# Patient Record
Sex: MALE | NOT HISPANIC OR LATINO | Employment: FULL TIME | URBAN - NONMETROPOLITAN AREA
[De-identification: names, ages, dates, MRNs, and addresses within clinical notes are randomized per-mention and may not be internally consistent; named-entity substitution may affect disease eponyms.]

---

## 2023-06-28 ENCOUNTER — OFFICE VISIT (OUTPATIENT)
Dept: PRIMARY CARE | Facility: CLINIC | Age: 33
End: 2023-06-28
Payer: COMMERCIAL

## 2023-06-28 VITALS
DIASTOLIC BLOOD PRESSURE: 90 MMHG | HEART RATE: 82 BPM | SYSTOLIC BLOOD PRESSURE: 128 MMHG | WEIGHT: 247.2 LBS | TEMPERATURE: 97.7 F | OXYGEN SATURATION: 97 % | BODY MASS INDEX: 31.74 KG/M2

## 2023-06-28 DIAGNOSIS — Z23 IMMUNIZATION DUE: ICD-10-CM

## 2023-06-28 DIAGNOSIS — F41.0 PANIC ATTACKS: ICD-10-CM

## 2023-06-28 DIAGNOSIS — Z00.00 ANNUAL PHYSICAL EXAM: Primary | ICD-10-CM

## 2023-06-28 DIAGNOSIS — I47.10 SUPRAVENTRICULAR TACHYCARDIA (CMS-HCC): ICD-10-CM

## 2023-06-28 PROBLEM — I10 BENIGN ESSENTIAL HTN: Status: ACTIVE | Noted: 2023-06-28

## 2023-06-28 PROBLEM — G47.33 OSA (OBSTRUCTIVE SLEEP APNEA): Status: ACTIVE | Noted: 2023-06-28

## 2023-06-28 PROBLEM — E78.00 HYPERCHOLESTEREMIA: Status: ACTIVE | Noted: 2023-06-28

## 2023-06-28 PROCEDURE — 3080F DIAST BP >= 90 MM HG: CPT | Performed by: INTERNAL MEDICINE

## 2023-06-28 PROCEDURE — 99214 OFFICE O/P EST MOD 30 MIN: CPT | Performed by: INTERNAL MEDICINE

## 2023-06-28 PROCEDURE — 90715 TDAP VACCINE 7 YRS/> IM: CPT | Performed by: INTERNAL MEDICINE

## 2023-06-28 PROCEDURE — 90471 IMMUNIZATION ADMIN: CPT | Performed by: INTERNAL MEDICINE

## 2023-06-28 PROCEDURE — 99395 PREV VISIT EST AGE 18-39: CPT | Performed by: INTERNAL MEDICINE

## 2023-06-28 PROCEDURE — 3074F SYST BP LT 130 MM HG: CPT | Performed by: INTERNAL MEDICINE

## 2023-06-28 RX ORDER — SERTRALINE HYDROCHLORIDE 50 MG/1
1 TABLET, FILM COATED ORAL DAILY
COMMUNITY
Start: 2021-12-23 | End: 2023-06-28 | Stop reason: ALTCHOICE

## 2023-06-28 RX ORDER — METOPROLOL SUCCINATE 50 MG/1
1 TABLET, EXTENDED RELEASE ORAL 2 TIMES DAILY
COMMUNITY
Start: 2021-12-23

## 2023-06-28 RX ORDER — TIZANIDINE 2 MG/1
TABLET ORAL
COMMUNITY
Start: 2022-11-28 | End: 2023-06-28 | Stop reason: ALTCHOICE

## 2023-06-28 ASSESSMENT — ENCOUNTER SYMPTOMS
OCCASIONAL FEELINGS OF UNSTEADINESS: 0
UNEXPECTED WEIGHT CHANGE: 0
DIARRHEA: 0
LOSS OF SENSATION IN FEET: 0
DIFFICULTY URINATING: 0
FATIGUE: 0
HYPERTENSION: 1
SORE THROAT: 0
ARTHRALGIAS: 0
DIZZINESS: 0
HEADACHES: 0
WHEEZING: 0
FEVER: 0
ABDOMINAL PAIN: 0
BRUISES/BLEEDS EASILY: 0
PALPITATIONS: 0
BLOOD IN STOOL: 0
SINUS PAIN: 0
DEPRESSION: 0
COUGH: 0

## 2023-06-28 ASSESSMENT — ANXIETY QUESTIONNAIRES
7. FEELING AFRAID AS IF SOMETHING AWFUL MIGHT HAPPEN: NOT AT ALL
5. BEING SO RESTLESS THAT IT IS HARD TO SIT STILL: NOT AT ALL
3. WORRYING TOO MUCH ABOUT DIFFERENT THINGS: NOT AT ALL
1. FEELING NERVOUS, ANXIOUS, OR ON EDGE: NOT AT ALL
2. NOT BEING ABLE TO STOP OR CONTROL WORRYING: NOT AT ALL
GAD7 TOTAL SCORE: 0
4. TROUBLE RELAXING: NOT AT ALL
6. BECOMING EASILY ANNOYED OR IRRITABLE: NOT AT ALL

## 2023-06-28 ASSESSMENT — PATIENT HEALTH QUESTIONNAIRE - PHQ9
2. FEELING DOWN, DEPRESSED OR HOPELESS: NOT AT ALL
1. LITTLE INTEREST OR PLEASURE IN DOING THINGS: NOT AT ALL
SUM OF ALL RESPONSES TO PHQ9 QUESTIONS 1 AND 2: 0

## 2023-06-28 NOTE — PROGRESS NOTES
Subjective   Patient ID: Gabo Guzman is a 32 y.o. male who presents for Anxiety (Discuss stopping medication) and Hypertension.    Annual preventive visit  - Vaccinations reviewed  Needs a booster for tetanus vaccine  - Screening for depression and anxiety reviewed no significant findings  Needs complete blood work    Follow-up  - Anxiety history of panic attacks resolved patient off sertraline continue monitoring closely as needed because about exercise multivitamin healthy diet  - Paroxysmal supraventricular tachycardia continue with metoprolol controlled and no recurrence  Proceed with blood work  -Lumbosacral strain no complaints continue with conservative measures  - Suspect  obstructive sleep apnea patient doing well continue with healthy diet weight loss  Follow-up in 1 year  Follow-up blood work results closely    Anxiety  Patient reports no chest pain, dizziness or palpitations.       Hypertension  Associated symptoms include anxiety. Pertinent negatives include no chest pain, headaches or palpitations.          Review of Systems   Constitutional:  Negative for fatigue, fever and unexpected weight change.   HENT:  Negative for congestion, ear discharge, ear pain, mouth sores, sinus pain and sore throat.    Eyes:  Negative for visual disturbance.   Respiratory:  Negative for cough and wheezing.    Cardiovascular:  Negative for chest pain, palpitations and leg swelling.   Gastrointestinal:  Negative for abdominal pain, blood in stool and diarrhea.   Genitourinary:  Negative for difficulty urinating.   Musculoskeletal:  Negative for arthralgias.   Skin:  Negative for rash.   Neurological:  Negative for dizziness and headaches.   Hematological:  Does not bruise/bleed easily.   Psychiatric/Behavioral:  Negative for behavioral problems.    All other systems reviewed and are negative.      Objective   Lab Results   Component Value Date    HGBA1C 5.2 12/23/2021      /90   Pulse 82   Temp 36.5 °C (97.7 °F)    Wt 112 kg (247 lb 3.2 oz)   SpO2 97%   BMI 31.74 kg/m²     Physical Exam  Vitals and nursing note reviewed.   Constitutional:       Appearance: Normal appearance.   HENT:      Head: Normocephalic.      Nose: Nose normal.   Eyes:      Conjunctiva/sclera: Conjunctivae normal.      Pupils: Pupils are equal, round, and reactive to light.   Cardiovascular:      Rate and Rhythm: Regular rhythm.   Pulmonary:      Effort: Pulmonary effort is normal.      Breath sounds: Normal breath sounds.   Abdominal:      General: Abdomen is flat.      Palpations: Abdomen is soft.   Musculoskeletal:      Cervical back: Neck supple.   Skin:     General: Skin is warm.   Neurological:      General: No focal deficit present.      Mental Status: He is oriented to person, place, and time.   Psychiatric:         Mood and Affect: Mood normal.         Assessment/Plan   Gabo was seen today for anxiety and hypertension.  Diagnoses and all orders for this visit:  Annual physical exam (Primary)  -     CBC and Auto Differential; Future  -     Comprehensive Metabolic Panel; Future  -     Lipid Panel; Future  -     TSH with reflex to Free T4 if abnormal; Future  Panic attacks  Supraventricular tachycardia (CMS/HCC)  Other orders  -     Follow Up In Primary Care - Health Maintenance; Future   Annual preventive visit  - Vaccinations reviewed  Needs a booster for tetanus vaccine  - Screening for depression and anxiety reviewed no significant findings  Needs complete blood work    Follow-up  - Anxiety history of panic attacks resolved patient off sertraline continue monitoring closely as needed because about exercise multivitamin healthy diet  - Paroxysmal supraventricular tachycardia continue with metoprolol controlled and no recurrence  Proceed with blood work  -Lumbosacral strain no complaints continue with conservative measures  - Suspect  obstructive sleep apnea patient doing well continue with healthy diet weight loss  Follow-up in 1 year  Follow-up  blood work results closely

## 2023-09-06 ENCOUNTER — OFFICE VISIT (OUTPATIENT)
Dept: PRIMARY CARE | Facility: CLINIC | Age: 33
End: 2023-09-06
Payer: COMMERCIAL

## 2023-09-06 ENCOUNTER — LAB (OUTPATIENT)
Dept: LAB | Facility: LAB | Age: 33
End: 2023-09-06
Payer: COMMERCIAL

## 2023-09-06 VITALS
OXYGEN SATURATION: 98 % | WEIGHT: 255 LBS | HEART RATE: 103 BPM | BODY MASS INDEX: 32.74 KG/M2 | DIASTOLIC BLOOD PRESSURE: 84 MMHG | SYSTOLIC BLOOD PRESSURE: 128 MMHG | TEMPERATURE: 98.1 F

## 2023-09-06 DIAGNOSIS — Z11.3 SCREENING FOR STDS (SEXUALLY TRANSMITTED DISEASES): ICD-10-CM

## 2023-09-06 DIAGNOSIS — N48.9 PENILE LESION: Primary | ICD-10-CM

## 2023-09-06 PROCEDURE — 3074F SYST BP LT 130 MM HG: CPT | Performed by: INTERNAL MEDICINE

## 2023-09-06 PROCEDURE — 86696 HERPES SIMPLEX TYPE 2 TEST: CPT

## 2023-09-06 PROCEDURE — 86803 HEPATITIS C AB TEST: CPT

## 2023-09-06 PROCEDURE — 86695 HERPES SIMPLEX TYPE 1 TEST: CPT

## 2023-09-06 PROCEDURE — 3079F DIAST BP 80-89 MM HG: CPT | Performed by: INTERNAL MEDICINE

## 2023-09-06 PROCEDURE — 99214 OFFICE O/P EST MOD 30 MIN: CPT | Performed by: INTERNAL MEDICINE

## 2023-09-06 PROCEDURE — 36415 COLL VENOUS BLD VENIPUNCTURE: CPT

## 2023-09-06 PROCEDURE — 86780 TREPONEMA PALLIDUM: CPT

## 2023-09-06 PROCEDURE — 87389 HIV-1 AG W/HIV-1&-2 AB AG IA: CPT

## 2023-09-06 PROCEDURE — 86694 HERPES SIMPLEX NES ANTBDY: CPT

## 2023-09-06 RX ORDER — DOXYCYCLINE 100 MG/1
100 CAPSULE ORAL 2 TIMES DAILY
Qty: 14 CAPSULE | Refills: 0 | Status: SHIPPED | OUTPATIENT
Start: 2023-09-06 | End: 2023-09-13

## 2023-09-06 RX ORDER — CLOTRIMAZOLE AND BETAMETHASONE DIPROPIONATE 10; .64 MG/G; MG/G
1 CREAM TOPICAL 2 TIMES DAILY
Qty: 15 G | Refills: 0 | Status: SHIPPED | OUTPATIENT
Start: 2023-09-06 | End: 2023-10-06

## 2023-09-06 ASSESSMENT — ENCOUNTER SYMPTOMS
DIZZINESS: 0
BLOOD IN STOOL: 0
HEADACHES: 0
WHEEZING: 0
PALPITATIONS: 0
ABDOMINAL PAIN: 0
SORE THROAT: 0
SINUS PAIN: 0
FATIGUE: 0
FEVER: 0
ARTHRALGIAS: 0
BRUISES/BLEEDS EASILY: 0
DIFFICULTY URINATING: 0
COUGH: 0
UNEXPECTED WEIGHT CHANGE: 0
DIARRHEA: 0

## 2023-09-06 NOTE — PROGRESS NOTES
Subjective   Patient ID: Gabo Guzman is a 32 y.o. male who presents for Penis Injury (Red spot, no drainage or itching) and Flu Vaccine (Decline).    -Red spot on the glans penis no drainage or itching for 4 weeks occasional irritation redness around the corona patient is circumcised  Dry irritated small spot on the glans penis  Possible underlying eczematous dermatitis  Patient given prescription for Lotrisone cream twice a day  Add doxycycline twice a day for 7 to 10 days  -Patient counseled about STDs and prevention also obtain STD screening  - Anxiety history of panic attacks resolved patient off sertraline continue monitoring closely as needed because about exercise multivitamin healthy diet  - Paroxysmal supraventricular tachycardia continue with metoprolol controlled and no recurrence  Proceed with blood work  -Lumbosacral strain no complaints continue with conservative measures  - Suspect  obstructive sleep apnea patient doing well continue with healthy diet weight loss follow-up with us closely  Rehabilitation 2 weeks           Review of Systems   Constitutional:  Negative for fatigue, fever and unexpected weight change.   HENT:  Negative for congestion, ear discharge, ear pain, mouth sores, sinus pain and sore throat.    Eyes:  Negative for visual disturbance.   Respiratory:  Negative for cough and wheezing.    Cardiovascular:  Negative for chest pain, palpitations and leg swelling.   Gastrointestinal:  Negative for abdominal pain, blood in stool and diarrhea.   Genitourinary:  Positive for genital sores. Negative for difficulty urinating.   Musculoskeletal:  Negative for arthralgias.   Skin:  Negative for rash.   Neurological:  Negative for dizziness and headaches.   Hematological:  Does not bruise/bleed easily.   Psychiatric/Behavioral:  Negative for behavioral problems.    All other systems reviewed and are negative.      Objective   Lab Results   Component Value Date    HGBA1C 5.2 12/23/2021      BP  128/84   Pulse 103   Temp 36.7 °C (98.1 °F)   Wt 116 kg (255 lb)   SpO2 98%   BMI 32.74 kg/m²     Physical Exam  Vitals and nursing note reviewed.   Constitutional:       Appearance: Normal appearance.   HENT:      Head: Normocephalic.      Nose: Nose normal.   Eyes:      Conjunctiva/sclera: Conjunctivae normal.      Pupils: Pupils are equal, round, and reactive to light.   Cardiovascular:      Rate and Rhythm: Regular rhythm.   Pulmonary:      Effort: Pulmonary effort is normal.      Breath sounds: Normal breath sounds.   Abdominal:      General: Abdomen is flat.      Palpations: Abdomen is soft.   Musculoskeletal:      Cervical back: Neck supple.   Skin:     General: Skin is warm.      Findings: Lesion (Small dry penile lesion on the glans penis with dry skin also irritation around the glans penis no lymphadenopathy) present.   Neurological:      General: No focal deficit present.      Mental Status: He is oriented to person, place, and time.   Psychiatric:         Mood and Affect: Mood normal.         Assessment/Plan   Gabo was seen today for penis injury and flu vaccine.  Diagnoses and all orders for this visit:  Penile lesion (Primary)  -     doxycycline (Vibramycin) 100 mg capsule; Take 1 capsule (100 mg) by mouth 2 times a day for 7 days. Take with at least 8 ounces (large glass) of water, do not lie down for 30 minutes after  -     clotrimazole-betamethasone (Lotrisone) cream; Apply 1 Application topically 2 times a day.  Screening for STDs (sexually transmitted diseases)  -     Hepatitis C Antibody; Future  -     HIV 1/2 Antigen/Antibody Screen with Reflex to Confirmation; Future  -     HSV1 IgG and HSV2 IgG; Future  -     HSV Type I/II IgM Antibody; Future  -     Syphilis Screen with Reflex; Future  Other orders  -     Follow Up In Primary Care - Established; Future   -Red spot on the glans penis no drainage or itching for 4 weeks occasional irritation redness around the corona patient is  circumcised  Dry irritated small spot on the glans penis  Possible underlying eczematous dermatitis  Patient given prescription for Lotrisone cream twice a day  Add doxycycline twice a day for 7 to 10 days  -Patient counseled about STDs and prevention also obtain STD screening  - Anxiety history of panic attacks resolved patient off sertraline continue monitoring closely as needed because about exercise multivitamin healthy diet  - Paroxysmal supraventricular tachycardia continue with metoprolol controlled and no recurrence  Proceed with blood work  -Lumbosacral strain no complaints continue with conservative measures  - Suspect  obstructive sleep apnea patient doing well continue with healthy diet weight loss follow-up with us closely  Rehabilitation 2 weeks

## 2023-09-07 LAB
HEPATITIS C VIRUS AB PRESENCE IN SERUM: NONREACTIVE
HERPES SIMPLEX VIRUS 1 IGG: <0.2 INDEX
HERPES SIMPLEX VIRUS 2 IGG: 0.3 INDEX
HIV 1/ 2 AG/AB SCREEN: NONREACTIVE

## 2023-09-08 LAB — SYPHILIS TOTAL AB: NONREACTIVE

## 2023-09-12 LAB — HERPES SIMPLEX VIRUS I/II ANTIBODY, IGM: 0.16 IV

## 2023-09-20 ENCOUNTER — APPOINTMENT (OUTPATIENT)
Dept: PRIMARY CARE | Facility: CLINIC | Age: 33
End: 2023-09-20
Payer: COMMERCIAL

## 2024-06-17 ENCOUNTER — HOSPITAL ENCOUNTER (OUTPATIENT)
Dept: RADIOLOGY | Facility: HOSPITAL | Age: 34
Discharge: HOME | End: 2024-06-17
Payer: COMMERCIAL

## 2024-06-17 DIAGNOSIS — Z02.1 ENCOUNTER FOR PRE-EMPLOYMENT EXAMINATION: ICD-10-CM

## 2024-06-17 PROCEDURE — 71046 X-RAY EXAM CHEST 2 VIEWS: CPT

## 2024-06-17 PROCEDURE — 71046 X-RAY EXAM CHEST 2 VIEWS: CPT | Performed by: STUDENT IN AN ORGANIZED HEALTH CARE EDUCATION/TRAINING PROGRAM

## 2024-06-25 ENCOUNTER — HOSPITAL ENCOUNTER (OUTPATIENT)
Dept: CARDIOLOGY | Facility: HOSPITAL | Age: 34
Discharge: HOME | End: 2024-06-25
Payer: COMMERCIAL

## 2024-06-25 DIAGNOSIS — Z02.1 ENCOUNTER FOR PRE-EMPLOYMENT EXAMINATION: ICD-10-CM

## 2024-06-25 PROCEDURE — 93017 CV STRESS TEST TRACING ONLY: CPT

## 2024-06-25 PROCEDURE — 93016 CV STRESS TEST SUPVJ ONLY: CPT | Performed by: INTERNAL MEDICINE

## 2024-06-25 PROCEDURE — 93018 CV STRESS TEST I&R ONLY: CPT | Performed by: INTERNAL MEDICINE

## 2024-07-01 ENCOUNTER — APPOINTMENT (OUTPATIENT)
Dept: PRIMARY CARE | Facility: CLINIC | Age: 34
End: 2024-07-01
Payer: COMMERCIAL

## 2024-10-07 ENCOUNTER — OFFICE VISIT (OUTPATIENT)
Dept: PRIMARY CARE | Facility: CLINIC | Age: 34
End: 2024-10-07
Payer: COMMERCIAL

## 2024-10-07 ENCOUNTER — LAB (OUTPATIENT)
Dept: LAB | Facility: LAB | Age: 34
End: 2024-10-07
Payer: COMMERCIAL

## 2024-10-07 VITALS
BODY MASS INDEX: 30.75 KG/M2 | SYSTOLIC BLOOD PRESSURE: 132 MMHG | DIASTOLIC BLOOD PRESSURE: 62 MMHG | HEART RATE: 70 BPM | HEIGHT: 73 IN | WEIGHT: 232 LBS | TEMPERATURE: 97.6 F | OXYGEN SATURATION: 99 %

## 2024-10-07 DIAGNOSIS — Z98.890 HISTORY OF RADIOFREQUENCY ABLATION (RFA) PROCEDURE FOR CARDIAC ARRHYTHMIA: ICD-10-CM

## 2024-10-07 DIAGNOSIS — Z00.00 ROUTINE ADULT HEALTH MAINTENANCE: ICD-10-CM

## 2024-10-07 DIAGNOSIS — E78.00 HYPERCHOLESTEREMIA: ICD-10-CM

## 2024-10-07 DIAGNOSIS — I10 BENIGN ESSENTIAL HTN: ICD-10-CM

## 2024-10-07 DIAGNOSIS — Z00.00 ROUTINE ADULT HEALTH MAINTENANCE: Primary | ICD-10-CM

## 2024-10-07 PROBLEM — G47.33 OBSTRUCTIVE SLEEP APNEA SYNDROME: Status: ACTIVE | Noted: 2024-10-07

## 2024-10-07 LAB
ALBUMIN SERPL BCP-MCNC: 4.7 G/DL (ref 3.4–5)
ALP SERPL-CCNC: 75 U/L (ref 33–120)
ALT SERPL W P-5'-P-CCNC: 23 U/L (ref 10–52)
ANION GAP SERPL CALC-SCNC: 11 MMOL/L (ref 10–20)
AST SERPL W P-5'-P-CCNC: 21 U/L (ref 9–39)
BILIRUB SERPL-MCNC: 0.6 MG/DL (ref 0–1.2)
BUN SERPL-MCNC: 13 MG/DL (ref 6–23)
CALCIUM SERPL-MCNC: 9.6 MG/DL (ref 8.6–10.3)
CHLORIDE SERPL-SCNC: 105 MMOL/L (ref 98–107)
CHOLEST SERPL-MCNC: 202 MG/DL (ref 0–199)
CHOLESTEROL/HDL RATIO: 4
CO2 SERPL-SCNC: 29 MMOL/L (ref 21–32)
CREAT SERPL-MCNC: 0.88 MG/DL (ref 0.5–1.3)
EGFRCR SERPLBLD CKD-EPI 2021: >90 ML/MIN/1.73M*2
ERYTHROCYTE [DISTWIDTH] IN BLOOD BY AUTOMATED COUNT: 13.2 % (ref 11.5–14.5)
GLUCOSE SERPL-MCNC: 92 MG/DL (ref 74–99)
HCT VFR BLD AUTO: 47.5 % (ref 41–52)
HDLC SERPL-MCNC: 50.7 MG/DL
HGB BLD-MCNC: 15.2 G/DL (ref 13.5–17.5)
LDLC SERPL CALC-MCNC: 136 MG/DL
MCH RBC QN AUTO: 27.8 PG (ref 26–34)
MCHC RBC AUTO-ENTMCNC: 32 G/DL (ref 32–36)
MCV RBC AUTO: 87 FL (ref 80–100)
NON HDL CHOLESTEROL: 151 MG/DL (ref 0–149)
NRBC BLD-RTO: 0 /100 WBCS (ref 0–0)
PLATELET # BLD AUTO: 251 X10*3/UL (ref 150–450)
POTASSIUM SERPL-SCNC: 4.5 MMOL/L (ref 3.5–5.3)
PROT SERPL-MCNC: 6.8 G/DL (ref 6.4–8.2)
RBC # BLD AUTO: 5.47 X10*6/UL (ref 4.5–5.9)
SODIUM SERPL-SCNC: 140 MMOL/L (ref 136–145)
TRIGL SERPL-MCNC: 75 MG/DL (ref 0–149)
TSH SERPL-ACNC: 1.48 MIU/L (ref 0.44–3.98)
VLDL: 15 MG/DL (ref 0–40)
WBC # BLD AUTO: 7 X10*3/UL (ref 4.4–11.3)

## 2024-10-07 PROCEDURE — 85027 COMPLETE CBC AUTOMATED: CPT

## 2024-10-07 PROCEDURE — 36415 COLL VENOUS BLD VENIPUNCTURE: CPT

## 2024-10-07 PROCEDURE — 99385 PREV VISIT NEW AGE 18-39: CPT | Performed by: FAMILY MEDICINE

## 2024-10-07 PROCEDURE — 80061 LIPID PANEL: CPT

## 2024-10-07 PROCEDURE — 80053 COMPREHEN METABOLIC PANEL: CPT

## 2024-10-07 PROCEDURE — 3008F BODY MASS INDEX DOCD: CPT | Performed by: FAMILY MEDICINE

## 2024-10-07 PROCEDURE — 3078F DIAST BP <80 MM HG: CPT | Performed by: FAMILY MEDICINE

## 2024-10-07 PROCEDURE — 3075F SYST BP GE 130 - 139MM HG: CPT | Performed by: FAMILY MEDICINE

## 2024-10-07 PROCEDURE — 84443 ASSAY THYROID STIM HORMONE: CPT

## 2024-10-07 ASSESSMENT — PAIN SCALES - GENERAL: PAINLEVEL: 0-NO PAIN

## 2024-10-07 ASSESSMENT — PATIENT HEALTH QUESTIONNAIRE - PHQ9
2. FEELING DOWN, DEPRESSED OR HOPELESS: NOT AT ALL
SUM OF ALL RESPONSES TO PHQ9 QUESTIONS 1 AND 2: 0
1. LITTLE INTEREST OR PLEASURE IN DOING THINGS: NOT AT ALL

## 2024-10-07 NOTE — ASSESSMENT & PLAN NOTE
- Will monitor cholesterol levels with blood work ordered today  -Continue to focus on healthy, low-fat diet with moderation of carbohydrates/calories/processed foods

## 2024-10-07 NOTE — PROGRESS NOTES
Outpatient Visit Note    Chief Complaint   Patient presents with    New Patient Visit    Annual Exam       HPI:  Gabo Guzman is a 33 y.o. male who presents to the office as a new patient to establish care and for annual well exam    Review of chart notes that patient was previously established with primary care provider in [place], having last been seen in June 2023 for well exam.    He is an active member of the [place] [employer]    Patient admitted to a history of anxiety with panic attacks which had resolved.  Had previously been on sertraline regimen with medication discontinued with mood improvement.  He additionally had history of SVT controlled with metoprolol.  Has an established relationship with Dr. Galicia of cardiology having undergone previous radiofrequency ablation and heart cath.    Last panel blood work completed on 9/6/2023 including STD panel and hepatitis C screening, all of which were negative.  Beyond this patient had additional panel completed in December 2021 including CBC and A1C, all of which were normal.    Well Exam:  Overall, they describe their health as good with no reports of recent illness or hospitalization. They state that their diet is good, with 40 pound loss secondary to lifestyle medication. In regards to physical activity, exercising at least 3-4 times a week. Denies any significant sleep complaints. Denies issues of chest pain, shortness of breath, headaches, vision/hearing changes, abdominal pain, vomiting, diarrhea, melena, hematochezia, constipation or urinary symptoms.    Preventative Health Maintenance:  In regards to preventative health maintenance, last Tdap received in 2023. Flu shot due at this time. COVID-19 vaccine series completed with patient currently due for booster.      Current Medications  Current Outpatient Medications   Medication Instructions    metoprolol succinate XL (Toprol-XL) 50 mg 24 hr tablet 1 tablet, oral, 2 times daily         Allergies  No Known Allergies     Immunizations  Immunization History   Administered Date(s) Administered    Moderna SARS-CoV-2 Vaccination 12/29/2020, 01/27/2021    Tdap vaccine, age 7 year and older (BOOSTRIX, ADACEL) 06/28/2023        Past Medical History:   Diagnosis Date    Hypertension       Past Surgical History:   Procedure Laterality Date    OTHER SURGICAL HISTORY  06/04/2020    Corneal lasik    OTHER SURGICAL HISTORY  06/04/2020    Dental surgery    OTHER SURGICAL HISTORY  09/28/2021    Radiofrequency ablation     No family history on file.  Social History     Tobacco Use    Smoking status: Some Days     Types: Cigars    Smokeless tobacco: Current     Types: Chew   Vaping Use    Vaping status: Never Used   Substance Use Topics    Alcohol use: Not Currently    Drug use: Never       ROS  All pertinent positive symptoms are included in the history of present illness.  All other systems have been reviewed and are negative and noncontributory to this patient's current ailments.      VITAL SIGNS  Vitals:    10/07/24 0745   BP: 132/62   Pulse: 70   Temp: 36.4 °C (97.6 °F)   SpO2: 99%       PHYSICAL EXAM  GENERAL APPEARANCE: alert and oriented, Pleasant and cooperative, No Acute Distress.   HEENT: EOMI, PERRLA, TMs intact and flat bilaterally, patent nares, normal oropharynx, MMM  NECK: no lymphadenopathy, no thyromegaly.   HEART: RRR, normal S1S2, no murmurs, click or rubs.   LUNGS: clear to auscultation bilaterally, no wheezes/rhonchi/rales.   ABDOMEN: soft, non-tender, no organomegaly, no masses palpated, no guarding or rigidity.   EXTREMITIES: no edema, normal ROM  SKIN: normal, no rash, unremarkable.   NEUROLOGIC EXAM: non-focal exam.   MUSCULOSKELETAL: no gross abnormalities.   PSYCH: affect is normal, eye contact is good.     Assessment/Plan   Problem List Items Addressed This Visit             ICD-10-CM    Benign essential HTN I10     - Blood pressure stable in office today         Relevant Orders     TSH with reflex to Free T4 if abnormal    Lipid Panel    Comprehensive Metabolic Panel    CBC    Hypercholesteremia E78.00     - Will monitor cholesterol levels with blood work ordered today  -Continue to focus on healthy, low-fat diet with moderation of carbohydrates/calories/processed foods         Relevant Orders    TSH with reflex to Free T4 if abnormal    Lipid Panel    Comprehensive Metabolic Panel    History of radiofrequency ablation (RFA) procedure for cardiac arrhythmia Z98.890     - Continue with routine cardiology follow-up per protocol          Other Visit Diagnoses         Codes    Routine adult health maintenance    -  Primary Z00.00    Relevant Orders    TSH with reflex to Free T4 if abnormal    Lipid Panel    Comprehensive Metabolic Panel    CBC            Additional Visit Plans:  - Complete history and physical examination was performed    GENERAL RECOMMENDATIONS:  - Complete review of history of physical exam completed today  - A healthy diet to maintain a normal BMI (under 25) to reduce heart disease, risk for diabetes encouraged.  - Exercising 150 minutes per week and eating healthy to reduce heart disease.  - Blood pressure screen completed.    BLOOD TESTING:  - Orders for fasting routine blood work given today, to be completed at your earliest convenience  - Will contact you with the blood work results once received and reviewed.    General Recommendations include:  - Cholesterol and diabetes screen if risk factors (overweight, high blood pressure).  - Sexually transmitted infections if risk factors.  - Hepatitis C virus screen for all adults -previously completed and negative    VACCINATIONS RECOMMENDATIONS:  - Flu shot annually - advocated seasonally  - Tetanus booster every 10 years - up-to-date  - Pneumonia vaccination starting at 65 years old (or earlier if risk factors - smoker, diabetic, heart or lung conditions) -not due yet  - Shingles vaccine for those 50 years or older - check with  your insurance for SHINGRIX coverage and get it at your local pharmacy -not due yet  -COVID-19 vaccine series completed with patient currently due for booster    SCREENINGS RECOMMENDATIONS:  -Colon cancer screening (with colonoscopy or Cologuard) for men and women starting at age 45 until 74 years old - not due yet    Counseling:       Medication education:         Education:  The patient is counseled regarding potential side-effects of all new medications        Understanding:  Patient expressed understanding        Adherence:  No barriers to adherence identified      ** Please excuse any errors in grammar or translation related to this dictation. Voice recognition software was utilized to prepare this document. **

## 2024-10-07 NOTE — PATIENT INSTRUCTIONS
Problem List Items Addressed This Visit             ICD-10-CM    Benign essential HTN I10     - Blood pressure stable in office today         Relevant Orders    TSH with reflex to Free T4 if abnormal    Lipid Panel    Comprehensive Metabolic Panel    CBC    Hypercholesteremia E78.00     - Will monitor cholesterol levels with blood work ordered today  -Continue to focus on healthy, low-fat diet with moderation of carbohydrates/calories/processed foods         Relevant Orders    TSH with reflex to Free T4 if abnormal    Lipid Panel    Comprehensive Metabolic Panel    History of radiofrequency ablation (RFA) procedure for cardiac arrhythmia Z98.890     - Continue with routine cardiology follow-up per protocol          Other Visit Diagnoses         Codes    Routine adult health maintenance    -  Primary Z00.00    Relevant Orders    TSH with reflex to Free T4 if abnormal    Lipid Panel    Comprehensive Metabolic Panel    CBC            Additional Visit Plans:  - Complete history and physical examination was performed    GENERAL RECOMMENDATIONS:  - Complete review of history of physical exam completed today  - A healthy diet to maintain a normal BMI (under 25) to reduce heart disease, risk for diabetes encouraged.  - Exercising 150 minutes per week and eating healthy to reduce heart disease.  - Blood pressure screen completed.    BLOOD TESTING:  - Orders for fasting routine blood work given today, to be completed at your earliest convenience  - Will contact you with the blood work results once received and reviewed.    General Recommendations include:  - Cholesterol and diabetes screen if risk factors (overweight, high blood pressure).  - Sexually transmitted infections if risk factors.  - Hepatitis C virus screen for all adults -previously completed and negative    VACCINATIONS RECOMMENDATIONS:  - Flu shot annually - advocated seasonally  - Tetanus booster every 10 years - up-to-date  - Pneumonia vaccination starting at  65 years old (or earlier if risk factors - smoker, diabetic, heart or lung conditions) -not due yet  - Shingles vaccine for those 50 years or older - check with your insurance for SHINGRIX coverage and get it at your local pharmacy -not due yet  -COVID-19 vaccine series completed with patient currently due for booster    SCREENINGS RECOMMENDATIONS:  -Colon cancer screening (with colonoscopy or Cologuard) for men and women starting at age 45 until 74 years old - not due yet    Counseling:       Medication education:         Education:  The patient is counseled regarding potential side-effects of all new medications        Understanding:  Patient expressed understanding        Adherence:  No barriers to adherence identified      ** Please excuse any errors in grammar or translation related to this dictation. Voice recognition software was utilized to prepare this document. **

## 2025-06-19 ENCOUNTER — ANCILLARY PROCEDURE (OUTPATIENT)
Dept: URGENT CARE | Age: 35
End: 2025-06-19
Payer: COMMERCIAL

## 2025-06-19 ENCOUNTER — OFFICE VISIT (OUTPATIENT)
Dept: URGENT CARE | Age: 35
End: 2025-06-19
Payer: COMMERCIAL

## 2025-06-19 VITALS
WEIGHT: 232 LBS | DIASTOLIC BLOOD PRESSURE: 78 MMHG | RESPIRATION RATE: 16 BRPM | TEMPERATURE: 97.4 F | BODY MASS INDEX: 30.61 KG/M2 | HEART RATE: 68 BPM | OXYGEN SATURATION: 98 % | SYSTOLIC BLOOD PRESSURE: 134 MMHG

## 2025-06-19 DIAGNOSIS — M25.562 LEFT KNEE PAIN, UNSPECIFIED CHRONICITY: ICD-10-CM

## 2025-06-19 PROCEDURE — 73564 X-RAY EXAM KNEE 4 OR MORE: CPT | Mod: LEFT SIDE

## 2025-06-19 ASSESSMENT — ENCOUNTER SYMPTOMS: ARTHRALGIAS: 1

## 2025-06-19 NOTE — PROGRESS NOTES
Subjective   Patient ID: Gabo Guzman is a 34 y.o. male. They present today with a chief complaint of Knee Pain (Patient has started running again around 3 months ago and has been getting severe knee stiffness and has gradually getting more pain over time and left knee is in pain. Pain has been worse since yesterday ).    History of Present Illness  HPI    Past Medical History  Allergies as of 06/19/2025    (No Known Allergies)       Prescriptions Prior to Admission[1]     Medical History[2]    Surgical History[3]     reports that he has been smoking cigars. His smokeless tobacco use includes chew. He reports that he does not currently use alcohol. He reports that he does not use drugs.    Review of Systems  Review of Systems   Musculoskeletal:  Positive for arthralgias.   All other systems reviewed and are negative.                                 Objective    Vitals:    06/19/25 1433   BP: 134/78   BP Location: Left arm   Patient Position: Sitting   BP Cuff Size: Adult   Pulse: 68   Resp: 16   Temp: 36.3 °C (97.4 °F)   TempSrc: Oral   SpO2: 98%   Weight: 105 kg (232 lb)     No LMP for male patient.    Physical Exam  Vitals reviewed.   Constitutional:       Appearance: Normal appearance.   HENT:      Head: Normocephalic and atraumatic.   Cardiovascular:      Rate and Rhythm: Normal rate and regular rhythm.      Pulses: Normal pulses.      Heart sounds: Normal heart sounds.   Pulmonary:      Effort: Pulmonary effort is normal.      Breath sounds: Normal breath sounds.   Musculoskeletal:         General: Tenderness present. Normal range of motion.      Cervical back: Normal range of motion.   Skin:     General: Skin is warm.      Capillary Refill: Capillary refill takes less than 2 seconds.   Neurological:      General: No focal deficit present.      Mental Status: He is alert and oriented to person, place, and time.   Psychiatric:         Mood and Affect: Mood normal.         Behavior: Behavior normal.          Procedures    Point of Care Test & Imaging Results from this visit  No results found for this visit on 06/19/25.   Imaging  No results found.    Cardiology, Vascular, and Other Imaging  No other imaging results found for the past 2 days      Diagnostic study results (if any) were reviewed by PORFIRIO Grande.    Assessment/Plan   Allergies, medications, history, and pertinent labs/EKGs/Imaging reviewed by PORFIRIO Grande.     Medical Decision Making  ***    Orders and Diagnoses  There are no diagnoses linked to this encounter.    Medical Admin Record      Patient disposition: { Disposition:[id number]}    Electronically signed by PORFIRIO Grande  2:42 PM           [1] (Not in a hospital admission)  [2]   Past Medical History:  Diagnosis Date    Hypertension    [3]   Past Surgical History:  Procedure Laterality Date    OTHER SURGICAL HISTORY  06/04/2020    Corneal lasik    OTHER SURGICAL HISTORY  06/04/2020    Dental surgery    OTHER SURGICAL HISTORY  09/28/2021    Radiofrequency ablation

## 2025-06-19 NOTE — PATIENT INSTRUCTIONS
I gave you a referral for orthopedics your x-ray did not show any fracture or dislocation today.  Follow-up with orthopedics in 1 to 2 days for further management to see if they want to do any further imaging rest your knee you can try a knee brace [facility] has elastic knee braces you can purchase.  Do not sleep in the brace elevate do not run until seen by orthopedics.  Any worsening symptoms go to the emergency room.

## 2025-06-24 ENCOUNTER — HOSPITAL ENCOUNTER (OUTPATIENT)
Dept: RADIOLOGY | Facility: CLINIC | Age: 35
Discharge: HOME | End: 2025-06-24
Payer: COMMERCIAL

## 2025-06-24 ENCOUNTER — APPOINTMENT (OUTPATIENT)
Dept: ORTHOPEDIC SURGERY | Facility: CLINIC | Age: 35
End: 2025-06-24
Payer: COMMERCIAL

## 2025-06-24 VITALS — HEIGHT: 73 IN | WEIGHT: 213 LBS | BODY MASS INDEX: 28.23 KG/M2

## 2025-06-24 DIAGNOSIS — M70.52 PES ANSERINUS BURSITIS OF LEFT KNEE: Primary | ICD-10-CM

## 2025-06-24 DIAGNOSIS — M25.562 LEFT KNEE PAIN, UNSPECIFIED CHRONICITY: ICD-10-CM

## 2025-06-24 PROCEDURE — 73560 X-RAY EXAM OF KNEE 1 OR 2: CPT | Mod: LT

## 2025-06-24 PROCEDURE — 3008F BODY MASS INDEX DOCD: CPT | Performed by: ORTHOPAEDIC SURGERY

## 2025-06-24 PROCEDURE — 99203 OFFICE O/P NEW LOW 30 MIN: CPT | Performed by: ORTHOPAEDIC SURGERY

## 2025-06-24 PROCEDURE — 73560 X-RAY EXAM OF KNEE 1 OR 2: CPT | Mod: LEFT SIDE | Performed by: RADIOLOGY

## 2025-06-24 ASSESSMENT — PAIN - FUNCTIONAL ASSESSMENT: PAIN_FUNCTIONAL_ASSESSMENT: NO/DENIES PAIN

## 2025-06-24 NOTE — LETTER
June 24, 2025     Donovan Mukherjee DO  [address]  [address]  [facility] [address]    Patient: Gabo Guzman   YOB: 1990   Date of Visit: 6/24/2025       Dear Dr. Donovan Mukherjee DO:    Thank you for referring Gabo Guzman to me for evaluation. Below are my notes for this consultation.  If you have questions, please do not hesitate to call me. I look forward to following your patient along with you.       Sincerely,     Bharti Myers DO      CC: Court Barreto, VIANNEY-CNP  ______________________________________________________________________________________    PRIMARY CARE PHYSICIAN: Donovan Mukherjee DO  REFERRING PROVIDER: VIANNEY Grande-CNP  [address] [id number]     CONSULT ORTHOPAEDIC: Knee Evaluation        ASSESSMENT & PLAN    IMPRESSION:   Acute left knee pain  Hamstring tendinitis/pes bursitis, left knee    PLAN:   Discussed with patient findings and reviewed his current x-rays with him.  His normal findings radiographically and his symptoms to be secondary to hamstring overuse with some pes bursitis.  Overall he is significantly better with minimal intervention and I recommended continue with activities as tolerated while using pain as a guide.  He may consider potentially longer duration use of anti-inflammatories if necessary but as long as he continues to improve would not recommend any additional intervention will have him follow-up as needed.      SUBJECTIVE  CHIEF COMPLAINT:   Chief Complaint   Patient presents with   • Left Knee - Pain        HPI: Gabo Guzmna is a 34 y.o. patient. Gabo Guzman has had progressive problems with their left knee over the past 1 week. Started running back in march. One week ago was doing normal routine and after he finished starting getting issues with pain and walking. They do not report any history of trauma to the area(s). They deny having any numbness and tingling in their left knee. Their are no symptoms that are  interfering with their daily life currently at this time. Worse with sitting for long periods of time or any with movement notes sharp stabbing pain on the medial side of the knee. XR done 06/24/2025. Currently feels about 95% better.     FUNCTIONAL STATUS: occasionally limited.  AMBULATORY STATUS: Independent  PREVIOUS TREATMENTS: Advil, didn't help much  NSAIDS advil with mild improvement    HISTORY OF SURGERY ON AFFECTED KNEE(S): No   PREVIOUS NOTES REVIEWED: Yes, Urgent care notes.       REVIEW OF SYSTEMS  Constitutional: See HPI for pain assessment, No significant weight loss, recent trauma  Cardiovascular: No chest pain, shortness of breath  Respiratory: No difficulty breathing, cough  Gastrointestinal: No nausea, vomiting, diarrhea, constipation  Musculoskeletal: Noted in HPI, positive for pain, restricted motion, stiffness  Integumentary: No rashes, easy bruising, redness   Neurological: no numbness or tingling in extremities, no gait disturbances   Psychiatric: No mood changes, memory changes, social issues  Heme/Lymph: no excessive swelling, easy bruising, excessive bleeding  ENT: No hearing changes  Eyes: No vision changes    Medical History[1]     Allergies[2]     Surgical History[3]     Family History[4]     Social History     Socioeconomic History   • Marital status: Single     Spouse name: Not on file   • Number of children: Not on file   • Years of education: Not on file   • Highest education level: Not on file   Occupational History   • Not on file   Tobacco Use   • Smoking status: Former   • Smokeless tobacco: Former     Types: Chew   Vaping Use   • Vaping status: Never Used   Substance and Sexual Activity   • Alcohol use: Not Currently   • Drug use: Never   • Sexual activity: Not on file   Other Topics Concern   • Not on file   Social History Narrative   • Not on file     Social Drivers of Health     Financial Resource Strain: Not on file   Food Insecurity: Not on file   Transportation Needs: Not  "on file   Physical Activity: Not on file   Stress: Not on file   Social Connections: Not on file   Intimate Partner Violence: Not on file   Housing Stability: Not on file        CURRENT MEDICATIONS:   Current Medications[5]     OBJECTIVE    PHYSICAL EXAM      12/23/2021     8:52 AM 11/28/2022     9:19 AM 6/28/2023     1:28 PM 9/6/2023     2:28 PM 10/7/2024     7:45 AM 6/19/2025     2:33 PM 6/24/2025     9:05 AM   Vitals   Systolic 114 122 128 128 132 134    Diastolic 64 84 90 84 62 78    BP Location      Left arm    Heart Rate 94 84 82 103 70 68    Temp 36.7 °C (98 °F) 36.3 °C (97.3 °F) 36.5 °C (97.7 °F) 36.7 °C (98.1 °F) 36.4 °C (97.6 °F) 36.3 °C (97.4 °F)    Resp      16    Height     1.854 m (6' 1\")  1.854 m (6' 1\")   Weight (lb) 237.13 252.38 247.2 255 232 232 213   BMI 30.45 kg/m2 32.4 kg/m2 31.74 kg/m2 32.74 kg/m2 30.61 kg/m2 30.61 kg/m2 28.1 kg/m2   BSA (m2) 2.37 m2 2.44 m2 2.42 m2 2.46 m2 2.33 m2 2.33 m2 2.23 m2   Visit Report   Report Report Report Report Report      Body mass index is 28.1 kg/m².    GENERAL: A/Ox3, NAD. Appears healthy, well nourished  PSYCHIATRIC: Mood stable, appropriate memory recall  EYES: EOM intact, no scleral icterus  CARDIAC: regular rate  LUNGS: Breathing non-labored  SKIN: no erythema, rashes, or ecchymoses     MUSCULOSKELETAL:  Laterality: left Knee Exam  - Alignment: neutral  - ROM: Supine motion from 0 to greater than 100 degrees with no pain  - Effusion: 0  - Strength: knee extension and flexion 5/5, EHL/PF/DF motor intact  - Palpation: Mild tenderness along pes bursa  - Stability: Anterior/Posterior stable, varus/valgus stable  - Gait: normal  - Hip Exam: flexion to 100+ degrees, full extension, internal/external rotation adequate, and no pain with log roll  - Special Tests: Negative Herbert, negative Lachman      NEUROVASCULAR:  - Neurovascular Status: sensation intact to light touch distally  - Capillary refill brisk at extremities, Bilateral dorsalis pedis pulse 2+ "     IMAGING:  Multiple views of the affected left knee(s) demonstrate: No acute fracture, dislocation, deformity.   X-rays were personally reviewed and interpreted by me.  Radiology reports were reviewed by me as well, if readily available at the time.    ** Voice Recognition software was used to dictate this note. Please be aware that minor errors in the transcription may be present **    Bharti Myers,   Attending Surgeon  Joint Replacement and Adult Reconstructive Surgery  [place], [place]          [1]  Past Medical History:  Diagnosis Date   • Hypertension    [2]  No Known Allergies  [3]  Past Surgical History:  Procedure Laterality Date   • OTHER SURGICAL HISTORY  06/04/2020    Corneal lasik   • OTHER SURGICAL HISTORY  06/04/2020    Dental surgery   • OTHER SURGICAL HISTORY  09/28/2021    Radiofrequency ablation   [4]  No family history on file.  [5]  Current Outpatient Medications   Medication Sig Dispense Refill   • metoprolol succinate XL (Toprol-XL) 50 mg 24 hr tablet Take 1 tablet (50 mg) by mouth 2 times a day.       No current facility-administered medications for this visit.

## 2025-06-24 NOTE — PROGRESS NOTES
PRIMARY CARE PHYSICIAN: Donovan Mukherjee DO  REFERRING PROVIDER: Court Barreto, APRN-CNP  [address] [id number]     CONSULT ORTHOPAEDIC: Knee Evaluation        ASSESSMENT & PLAN    IMPRESSION:   Acute left knee pain  Hamstring tendinitis/pes bursitis, left knee    PLAN:   Discussed with patient findings and reviewed his current x-rays with him.  His normal findings radiographically and his symptoms to be secondary to hamstring overuse with some pes bursitis.  Overall he is significantly better with minimal intervention and I recommended continue with activities as tolerated while using pain as a guide.  He may consider potentially longer duration use of anti-inflammatories if necessary but as long as he continues to improve would not recommend any additional intervention will have him follow-up as needed.      SUBJECTIVE  CHIEF COMPLAINT:   Chief Complaint   Patient presents with    Left Knee - Pain        HPI: Gabo Guzman is a 34 y.o. patient. Gabo Guzman has had progressive problems with their left knee over the past 1 week. Started running back in march. One week ago was doing normal routine and after he finished starting getting issues with pain and walking. They do not report any history of trauma to the area(s). They deny having any numbness and tingling in their left knee. Their are no symptoms that are interfering with their daily life currently at this time. Worse with sitting for long periods of time or any with movement notes sharp stabbing pain on the medial side of the knee. XR done 06/24/2025. Currently feels about 95% better.     FUNCTIONAL STATUS: occasionally limited.  AMBULATORY STATUS: Independent  PREVIOUS TREATMENTS: Advil, didn't help much  NSAIDS advil with mild improvement    HISTORY OF SURGERY ON AFFECTED KNEE(S): No   PREVIOUS NOTES REVIEWED: Yes, Urgent care notes.       REVIEW OF SYSTEMS  Constitutional: See HPI for pain assessment, No significant weight loss,  recent trauma  Cardiovascular: No chest pain, shortness of breath  Respiratory: No difficulty breathing, cough  Gastrointestinal: No nausea, vomiting, diarrhea, constipation  Musculoskeletal: Noted in HPI, positive for pain, restricted motion, stiffness  Integumentary: No rashes, easy bruising, redness   Neurological: no numbness or tingling in extremities, no gait disturbances   Psychiatric: No mood changes, memory changes, social issues  Heme/Lymph: no excessive swelling, easy bruising, excessive bleeding  ENT: No hearing changes  Eyes: No vision changes    Medical History[1]     Allergies[2]     Surgical History[3]     Family History[4]     Social History     Socioeconomic History    Marital status: Single     Spouse name: Not on file    Number of children: Not on file    Years of education: Not on file    Highest education level: Not on file   Occupational History    Not on file   Tobacco Use    Smoking status: Former    Smokeless tobacco: Former     Types: Chew   Vaping Use    Vaping status: Never Used   Substance and Sexual Activity    Alcohol use: Not Currently    Drug use: Never    Sexual activity: Not on file   Other Topics Concern    Not on file   Social History Narrative    Not on file     Social Drivers of Health     Financial Resource Strain: Not on file   Food Insecurity: Not on file   Transportation Needs: Not on file   Physical Activity: Not on file   Stress: Not on file   Social Connections: Not on file   Intimate Partner Violence: Not on file   Housing Stability: Not on file        CURRENT MEDICATIONS:   Current Medications[5]     OBJECTIVE    PHYSICAL EXAM      12/23/2021     8:52 AM 11/28/2022     9:19 AM 6/28/2023     1:28 PM 9/6/2023     2:28 PM 10/7/2024     7:45 AM 6/19/2025     2:33 PM 6/24/2025     9:05 AM   Vitals   Systolic 114 122 128 128 132 134    Diastolic 64 84 90 84 62 78    BP Location      Left arm    Heart Rate 94 84 82 103 70 68    Temp 36.7 °C (98 °F) 36.3 °C (97.3 °F) 36.5 °C  "(97.7 °F) 36.7 °C (98.1 °F) 36.4 °C (97.6 °F) 36.3 °C (97.4 °F)    Resp      16    Height     1.854 m (6' 1\")  1.854 m (6' 1\")   Weight (lb) 237.13 252.38 247.2 255 232 232 213   BMI 30.45 kg/m2 32.4 kg/m2 31.74 kg/m2 32.74 kg/m2 30.61 kg/m2 30.61 kg/m2 28.1 kg/m2   BSA (m2) 2.37 m2 2.44 m2 2.42 m2 2.46 m2 2.33 m2 2.33 m2 2.23 m2   Visit Report   Report Report Report Report Report      Body mass index is 28.1 kg/m².    GENERAL: A/Ox3, NAD. Appears healthy, well nourished  PSYCHIATRIC: Mood stable, appropriate memory recall  EYES: EOM intact, no scleral icterus  CARDIAC: regular rate  LUNGS: Breathing non-labored  SKIN: no erythema, rashes, or ecchymoses     MUSCULOSKELETAL:  Laterality: left Knee Exam  - Alignment: neutral  - ROM: Supine motion from 0 to greater than 100 degrees with no pain  - Effusion: 0  - Strength: knee extension and flexion 5/5, EHL/PF/DF motor intact  - Palpation: Mild tenderness along pes bursa  - Stability: Anterior/Posterior stable, varus/valgus stable  - Gait: normal  - Hip Exam: flexion to 100+ degrees, full extension, internal/external rotation adequate, and no pain with log roll  - Special Tests: Negative Herbert, negative Lachman      NEUROVASCULAR:  - Neurovascular Status: sensation intact to light touch distally  - Capillary refill brisk at extremities, Bilateral dorsalis pedis pulse 2+     IMAGING:  Multiple views of the affected left knee(s) demonstrate: No acute fracture, dislocation, deformity.   X-rays were personally reviewed and interpreted by me.  Radiology reports were reviewed by me as well, if readily available at the time.    ** Voice Recognition software was used to dictate this note. Please be aware that minor errors in the transcription may be present **    Bharti Myers DO  Attending Surgeon  [facility] Replacement and Adult Reconstructive Surgery  [place], [place]          [1]   Past Medical History:  Diagnosis Date    Hypertension    [2] No Known " Allergies  [3]   Past Surgical History:  Procedure Laterality Date    OTHER SURGICAL HISTORY  06/04/2020    Corneal lasik    OTHER SURGICAL HISTORY  06/04/2020    Dental surgery    OTHER SURGICAL HISTORY  09/28/2021    Radiofrequency ablation   [4] No family history on file.  [5]   Current Outpatient Medications   Medication Sig Dispense Refill    metoprolol succinate XL (Toprol-XL) 50 mg 24 hr tablet Take 1 tablet (50 mg) by mouth 2 times a day.       No current facility-administered medications for this visit.

## 2025-07-01 ENCOUNTER — APPOINTMENT (OUTPATIENT)
Dept: ORTHOPEDIC SURGERY | Facility: CLINIC | Age: 35
End: 2025-07-01
Payer: COMMERCIAL